# Patient Record
Sex: FEMALE | Race: WHITE | Employment: OTHER | URBAN - METROPOLITAN AREA
[De-identification: names, ages, dates, MRNs, and addresses within clinical notes are randomized per-mention and may not be internally consistent; named-entity substitution may affect disease eponyms.]

---

## 2018-07-13 ENCOUNTER — THERAPY VISIT (OUTPATIENT)
Dept: PHYSICAL THERAPY | Facility: CLINIC | Age: 68
End: 2018-07-13
Payer: MEDICARE

## 2018-07-13 DIAGNOSIS — M54.16 LUMBAR RADICULOPATHY: Primary | ICD-10-CM

## 2018-07-13 PROCEDURE — 97110 THERAPEUTIC EXERCISES: CPT | Mod: GP | Performed by: PHYSICAL THERAPIST

## 2018-07-13 PROCEDURE — G8979 MOBILITY GOAL STATUS: HCPCS | Mod: GP | Performed by: PHYSICAL THERAPIST

## 2018-07-13 PROCEDURE — G8978 MOBILITY CURRENT STATUS: HCPCS | Mod: GP | Performed by: PHYSICAL THERAPIST

## 2018-07-13 PROCEDURE — 97161 PT EVAL LOW COMPLEX 20 MIN: CPT | Mod: GP | Performed by: PHYSICAL THERAPIST

## 2018-07-13 PROCEDURE — 97530 THERAPEUTIC ACTIVITIES: CPT | Mod: GP | Performed by: PHYSICAL THERAPIST

## 2018-07-13 ASSESSMENT — ACTIVITIES OF DAILY LIVING (ADL)
STANDING_FOR_15_MINUTES: EXTREME DIFFICULTY
HOS_ADL_COUNT: 16
PUTTING_ON_SOCKS_AND_SHOES: UNABLE TO DO
TWISTING/PIVOTING_ON_INVOLVED_LEG: UNABLE TO DO
ROLLING_OVER_IN_BED: EXTREME DIFFICULTY
HOS_ADL_ITEM_SCORE_TOTAL: 14
SITTING_FOR_15_MINUTES: MODERATE DIFFICULTY
LIGHT_TO_MODERATE_WORK: MODERATE DIFFICULTY
WALKING_UP_STEEP_HILLS: UNABLE TO DO
WALKING_INITIALLY: MODERATE DIFFICULTY
HOS_ADL_SCORE(%): 21.88
DEEP_SQUATTING: UNABLE TO DO
RECREATIONAL_ACTIVITIES: UNABLE TO DO
GETTING_INTO_AND_OUT_OF_AN_AVERAGE_CAR: MODERATE DIFFICULTY
WALKING_APPROXIMATELY_10_MINUTES: MODERATE DIFFICULTY
STEPPING_UP_AND_DOWN_CURBS: MODERATE DIFFICULTY
WALKING_DOWN_STEEP_HILLS: UNABLE TO DO
GOING_UP_1_FLIGHT_OF_STAIRS: UNABLE TO DO
HEAVY_WORK: EXTREME DIFFICULTY
GOING_DOWN_1_FLIGHT_OF_STAIRS: UNABLE TO DO
HOS_ADL_HIGHEST_POTENTIAL_SCORE: 64
WALKING_15_MINUTES_OR_GREATER: EXTREME DIFFICULTY

## 2018-07-13 NOTE — LETTER
DEPARTMENT OF HEALTH AND HUMAN SERVICES  CENTERS FOR MEDICARE & MEDICAID SERVICES    PLAN/UPDATED PLAN OF PROGRESS FOR OUTPATIENT REHABILITATION    PATIENTS NAME:  Tila Stephen     : 1950    PROVIDER NUMBER:    [id number]    [id number]     PROVIDER NAME: [facility]ER[facility]    MEDICAL RECORD NUMBER: [id number]     START OF CARE DATE:  SOC Date: 18   TYPE:  PT    PRIMARY/TREATMENT DIAGNOSIS: (Pertinent Medical Diagnosis)  Lumbar radiculopathy    VISITS FROM START OF CARE:  Rxs Used: 1     [facility] Initial Evaluation -- Lumbar  Date: 2018  Tila Stephen is a 67 year old female with a lumbar condition.   Referral: Dr. Weiss  Work mechanical stresses:  NA  Employment status:  retired  Leisure mechanical stresses: photography  Functional disability score (DAMION/STarT Back):  DAMION 62.22%, Megha High (7), HOS 21.88  VAS score (0-10): 6-7/10 R low back, 7/10 numbness R LE and bottom of her feet    Patient goals/expectations  Alleviate pain so that patient may be able to walk, stand, sit without back or LE symptoms.     HISTORY:  Present symptoms: R lower back pain with symptoms down the R thigh.  Has numbness on the ball of her feet and toes.  Decreased sensation lateral R thigh  Pain quality (sharp/shooting/stabbing/aching/burning/cramping):  Aching, sharp, numbness/tingling   Paresthesia (yes/no):  yes    Present since (onset date): 2018.     Symptoms (improving/unchanging/worsening):  Worsening and went into the ER on 2018.     Symptoms commenced as a result of: slipped in the shower   Condition occurred in the following environment:   home     Symptoms at onset (back/thigh/leg): R anterior hip and side  Constant symptoms (back/thigh/leg): R lower back, R hip to the anterior thigh.   Intermittent symptoms (back/thigh/leg): none  Symptoms are made worse with the following: Always Bending, Always Sitting, Always Rising,  Always Standing, Always Walking, Time of day - Always as the day progresses, Always On the move and Other - Lifting and carrying   Symptoms are made better with the following: Always Lying and Other - prednisone    Disturbed sleep (yes/no):  yes   Sleeping postures (prone/sup/side R/L): supine with wedge under back as patient has sleep apnea    Previous history: Has had numbness in her feet for the past 2 years, balls of her feet, but has not noticed lower back pain.  Previous treatments: was in the hospital due to current episode for about 5 days, released 7/10/2018.    Specific Questions:  Cough/Sneeze/Strain (pos/neg): positive  Bowel/Bladder (normal/abnormal): negative  Gait (normal/abnormal): slowed due to pain, not tripping   Medications (nil/NSAIDS/analg/steroids/anticoag/other):  Muscle relaxants and Steroids  Medical allergies:  Bactrim  General health (excellent/good/fair/poor):  good  Pertinent medical history:  Osteoarthritis, Overweight and Sleep disorder/apnea  Imaging (None/Xray/MRI/Other):  MRI-disc bulge L5-S1, L2-3, 3 mm degenerative retrolisthesis at L 3-4, foraminal stenosis L 2-3, L 3-4, L 4-5.  Had a CT scan of abdomen and pelvis.  Heart was cleared.  Recent or major surgery (yes/no):  no  Night pain (yes/no): no  Accidents (yes/no): yes, as above, fell in shower  Unexplained weight loss (yes/no): no  Barriers at home: no  Other red flags: no    EXAMINATION    Posture:   Sitting (good/fair/poor): poor  Standing (good/fair/poor):fair  Lordosis (red/acc/normal): decreased  Correction of posture (better/worse/no effect): better    Lateral Shift (right/left/nil): nil  Relevant (yes/no):  NA  Other Observations: antalgic, slowed gait    Neurological:  Motor deficit:  Pain with resisted hip flexion B, felt on the Rt    Reflexes:  Symmetrical LE - brisk Patellar, diminished AJ  Sensory deficit:  Decreased sensation lateral R thigh and R lateral lower leg    Dural signs:  Seated SLR on the R - Produced  R low back pain when lacking about 30 deg of R knee extension.          Movement Loss:   Apollo Mod Min Nil Pain   Flexion  x x  Increased R LB, R LE during   Extension x    Increased R LBP   Side Gliding R  x   Increased R low back pain   Side Gliding L   x  Increased LBP and R lat hip     Test Movements:   During: produces, abolishes, increases, decreases, no effect, centralizing, peripheralizing   After: better, worse, no better, no worse, no effect, centralized, peripheralized    Pretest symptoms standing: R lower back down R LE laterally into f   Symptoms During Symptoms After ROM increased ROM decreased No Effect   FIS          Rep FIS          EIS Increases    No Worse         Rep EIS Increases    Worse      x   Pretest symptoms lying: R lower back ache with numbness down R LE, numbness bottom of feet    Symptoms During Symptoms After ROM  ROM  No Effect   LORRAINE          Rep LORRAINE          EIL Decreases    No Better         Rep EIL Decreases    Better    X  Dec tension with seated SLR     Static Tests:  Sitting slouched:  Increased symptoms when sitting    Sitting erect:  Support felt good  Standing slouched not tested    Standing erect:  Not tested  Lying prone in extension:  6-7/10 R low back pain with 7/10 R LE symptoms to foot with numbness of the bottom of both feet - decreased pain, centralizing symptoms with prone lying further centralization with decreased LE symptoms after.  Increased LROM, B.    Long sitting:  Not tested    Provisional Classification:  Derangement - Asymmetrical, unilateral, symptoms below knee    Principle of Management:  Education:  Patient to sit with lumbar roll.  Avoid slouched sitting and flexion biased activity. Discussed centralization/peripheralization.  Not ready to perform EIS as she had increased pain after the exercise.      Equipment provided:  Lumbar roll  Mechanical therapy (Y/N):  yes   Extension principle:  Prone lying (if needed) --> prone lying in extension (3' or more)  --> EIL 10 reps every 2 hours    Lateral Principle:  no  Flexion principle:  no      ASSESSMENT/PLAN:  Patient is a 67 year old female with lumbar complaints.    Patient has the following significant findings with corresponding treatment plan.                  Diagnosis 1:  Lumbar radiculopathy    Pain -  manual therapy, self management, education, directional preference exercise and home program  Decreased ROM/flexibility - manual therapy, therapeutic exercise and home program  Impaired muscle performance - neuro re-education and home program  Decreased function - therapeutic activities and home program  Impaired posture - neuro re-education, therapeutic activities and home program    Therapy Evaluation Codes:   1) History comprised of:   Personal factors that impact the plan of care:      None.    Comorbidity factors that impact the plan of care are:      Numbness/tingling, Osteoarthritis and Overweight.     Medications impacting care: None.  2) Examination of Body Systems comprised of:   Body structures and functions that impact the plan of care:      Lumbar spine.   Activity limitations that impact the plan of care are:      Bending, Cooking, Driving, Lifting, Sitting, Stairs, Standing and Walking.  3) Clinical presentation characteristics are:   Evolving/Changing.  4) Decision-Making    Moderate complexity using standardized patient assessment instrument and/or measureable assessment of functional outcome.  Cumulative Therapy Evaluation is: Moderate complexity.    Previous and current functional limitations:  (See Goal Flow Sheet for this information)    Short term and Long term goals: (See Goal Flow Sheet for this information)     Communication ability:  Patient appears to be able to clearly communicate and understand verbal and written communication and follow directions correctly.  Treatment Explanation - The following has been discussed with the patient:   RX ordered/plan of care  Anticipated  "outcomes  Possible risks and side effects    This patient would benefit from PT intervention to resume normal activities.   Rehab potential is good.  Frequency:  2 X week, once daily  Duration:  for 3 weeks tapering to 1 X a week over 6 weeks    Discharge Plan:  Achieve all LTG.  Independent in home treatment program.  Reach maximal therapeutic benefit.    Please refer to the daily flowsheet for treatment today, total treatment time and time spent performing 1:1 timed codes.     Caregiver Signature/Credentials _____________________________ Date ________       Treating Provider: Edenilson Deleon, PT     I have reviewed and certified the need for these services and plan of treatment while under my care.      PHYSICIAN'S SIGNATURE:   _____________________________________  Date___________              Yetmwork Dallin Weiss MD    Certification period:  Beginning of Cert date period: 07/13/18 to  End of Cert period date: 10/10/18     Functional Level Progress Report: Please see attached \"Goal Flow sheet for Functional level.\"    ____X____ Continue Services or       ________ DC Services                Service dates: From  SOC Date: 07/13/18 date to present                         "

## 2018-07-13 NOTE — PROGRESS NOTES
[facility] Initial Evaluation -- Lumbar    Date: July 13, 2018  Tila Stephen is a 67 year old female with a lumbar condition.   Referral: Dr. Weiss  Work mechanical stresses:  NA  Employment status:  retired  Leisure mechanical stresses: photography  Functional disability score (DAMION/STarT Back):  DAMION 62.22%, Megha High (7), HOS 21.88  VAS score (0-10): 6-7/10 R low back, 7/10 numbness R LE and bottom of her feet    Patient goals/expectations  Alleviate pain so that patient may be able to walk, stand, sit without back or LE symptoms.     HISTORY:    Present symptoms: R lower back pain with symptoms down the R thigh.  Has numbness on the ball of her feet and toes.  Decreased sensation lateral R thigh  Pain quality (sharp/shooting/stabbing/aching/burning/cramping):  Aching, sharp, numbness/tingling   Paresthesia (yes/no):  yes    Present since (onset date): June 24, 2018.     Symptoms (improving/unchanging/worsening):  Worsening and went into the ER on 7/6/2018.     Symptoms commenced as a result of: slipped in the shower   Condition occurred in the following environment:   home     Symptoms at onset (back/thigh/leg): R anterior hip and side  Constant symptoms (back/thigh/leg): R lower back, R hip to the anterior thigh.   Intermittent symptoms (back/thigh/leg): none    Symptoms are made worse with the following: Always Bending, Always Sitting, Always Rising, Always Standing, Always Walking, Time of day - Always as the day progresses, Always On the move and Other - Lifting and carrying   Symptoms are made better with the following: Always Lying and Other - prednisone    Disturbed sleep (yes/no):  yes Sleeping postures (prone/sup/side R/L): supine with wedge under back as patient has sleep apnea    Previous history: Has had numbness in her feet for the past 2 years, balls of her feet, but has not noticed lower back pain.  Previous treatments: was in the hospital due to current episode for  about 5 days, released 7/10/2018.      Specific Questions:  Cough/Sneeze/Strain (pos/neg): positive  Bowel/Bladder (normal/abnormal): negative  Gait (normal/abnormal): slowed due to pain, not tripping   Medications (nil/NSAIDS/analg/steroids/anticoag/other):  Muscle relaxants and Steroids  Medical allergies:  Bactrim  General health (excellent/good/fair/poor):  good  Pertinent medical history:  Osteoarthritis, Overweight and Sleep disorder/apnea  Imaging (None/Xray/MRI/Other):  MRI-disc bulge L5-S1, L2-3, 3 mm degenerative retrolisthesis at L 3-4, foraminal stenosis L 2-3, L 3-4, L 4-5.  Had a CT scan of abdomen and pelvis.  Heart was cleared.  Recent or major surgery (yes/no):  no  Night pain (yes/no): no  Accidents (yes/no): yes, as above, fell in shower  Unexplained weight loss (yes/no): no  Barriers at home: no  Other red flags: no    EXAMINATION    Posture:   Sitting (good/fair/poor): poor  Standing (good/fair/poor):fair  Lordosis (red/acc/normal): decreased  Correction of posture (better/worse/no effect): better    Lateral Shift (right/left/nil): nil  Relevant (yes/no):  NA  Other Observations: antalgic, slowed gait    Neurological:    Motor deficit:  Pain with resisted hip flexion B, felt on the Rt    Reflexes:  Symmetrical LE - brisk Patellar, diminished AJ  Sensory deficit:  Decreased sensation lateral R thigh and R lateral lower leg    Dural signs:  Seated SLR on the R - Produced R low back pain when lacking about 30 deg of R knee extension.      Movement Loss:   Apollo Mod Min Nil Pain   Flexion  x x  Increased R LB, R LE during   Extension x    Increased R LBP   Side Gliding R  x   Increased R low back pain   Side Gliding L   x  Increased LBP and R lat hip     Test Movements:   During: produces, abolishes, increases, decreases, no effect, centralizing, peripheralizing   After: better, worse, no better, no worse, no effect, centralized, peripheralized    Pretest symptoms standing: R lower back down R LE  laterally into f   Symptoms During Symptoms After ROM increased ROM decreased No Effect   FIS          Rep FIS          EIS Increases    No Worse         Rep EIS Increases    Worse      x   Pretest symptoms lying: R lower back ache with numbness down R LE, numbness bottom of feet    Symptoms During Symptoms After ROM  ROM  No Effect   LORRAINE          Rep LORRAINE          EIL Decreases    No Better         Rep EIL Decreases    Better    X  Dec tension with seated SLR         Static Tests:  Sitting slouched:  Increased symptoms when sitting    Sitting erect:  Support felt good  Standing slouched not tested    Standing erect:  Not tested  Lying prone in extension:  6-7/10 R low back pain with 7/10 R LE symptoms to foot with numbness of the bottom of both feet - decreased pain, centralizing symptoms with prone lying further centralization with decreased LE symptoms after.  Increased LROM, B.    Long sitting:  Not tested      Provisional Classification:  Derangement - Asymmetrical, unilateral, symptoms below knee    Principle of Management:  Education:  Patient to sit with lumbar roll.  Avoid slouched sitting and flexion biased activity. Discussed centralization/peripheralization.  Not ready to perform EIS as she had increased pain after the exercise.      Equipment provided:  Lumbar roll  Mechanical therapy (Y/N):  yes   Extension principle:  Prone lying (if needed) --> prone lying in extension (3' or more) --> EIL 10 reps every 2 hours    Lateral Principle:  no  Flexion principle:  no      ASSESSMENT/PLAN:    Patient is a 67 year old female with lumbar complaints.    Patient has the following significant findings with corresponding treatment plan.                Diagnosis 1:  Lumbar radiculopathy    Pain -  manual therapy, self management, education, directional preference exercise and home program  Decreased ROM/flexibility - manual therapy, therapeutic exercise and home program  Impaired muscle performance - neuro  re-education and home program  Decreased function - therapeutic activities and home program  Impaired posture - neuro re-education, therapeutic activities and home program    Therapy Evaluation Codes:   1) History comprised of:   Personal factors that impact the plan of care:      None.    Comorbidity factors that impact the plan of care are:      Numbness/tingling, Osteoarthritis and Overweight.     Medications impacting care: None.  2) Examination of Body Systems comprised of:   Body structures and functions that impact the plan of care:      Lumbar spine.   Activity limitations that impact the plan of care are:      Bending, Cooking, Driving, Lifting, Sitting, Stairs, Standing and Walking.  3) Clinical presentation characteristics are:   Evolving/Changing.  4) Decision-Making    Moderate complexity using standardized patient assessment instrument and/or measureable assessment of functional outcome.  Cumulative Therapy Evaluation is: Moderate complexity.    Previous and current functional limitations:  (See Goal Flow Sheet for this information)    Short term and Long term goals: (See Goal Flow Sheet for this information)     Communication ability:  Patient appears to be able to clearly communicate and understand verbal and written communication and follow directions correctly.  Treatment Explanation - The following has been discussed with the patient:   RX ordered/plan of care  Anticipated outcomes  Possible risks and side effects  This patient would benefit from PT intervention to resume normal activities.   Rehab potential is good.    Frequency:  2 X week, once daily  Duration:  for 3 weeks tapering to 1 X a week over 6 weeks  Discharge Plan:  Achieve all LTG.  Independent in home treatment program.  Reach maximal therapeutic benefit.    Please refer to the daily flowsheet for treatment today, total treatment time and time spent performing 1:1 timed codes.

## 2018-07-17 ENCOUNTER — THERAPY VISIT (OUTPATIENT)
Dept: PHYSICAL THERAPY | Facility: CLINIC | Age: 68
End: 2018-07-17
Payer: MEDICARE

## 2018-07-17 DIAGNOSIS — M54.16 LUMBAR RADICULOPATHY: ICD-10-CM

## 2018-07-17 PROCEDURE — 97110 THERAPEUTIC EXERCISES: CPT | Mod: GP | Performed by: PHYSICAL THERAPIST

## 2018-07-17 PROCEDURE — G0283 ELEC STIM OTHER THAN WOUND: HCPCS | Mod: GP | Performed by: PHYSICAL THERAPIST

## 2018-07-17 PROCEDURE — 97140 MANUAL THERAPY 1/> REGIONS: CPT | Mod: GP | Performed by: PHYSICAL THERAPIST

## 2018-07-20 ENCOUNTER — THERAPY VISIT (OUTPATIENT)
Dept: PHYSICAL THERAPY | Facility: CLINIC | Age: 68
End: 2018-07-20
Payer: MEDICARE

## 2018-07-20 DIAGNOSIS — M54.16 LUMBAR RADICULOPATHY: ICD-10-CM

## 2018-07-20 PROCEDURE — 97140 MANUAL THERAPY 1/> REGIONS: CPT | Mod: GP | Performed by: PHYSICAL THERAPIST

## 2018-07-20 PROCEDURE — 97110 THERAPEUTIC EXERCISES: CPT | Mod: GP | Performed by: PHYSICAL THERAPIST

## 2018-07-20 PROCEDURE — G0283 ELEC STIM OTHER THAN WOUND: HCPCS | Mod: GP | Performed by: PHYSICAL THERAPIST

## 2018-07-25 ENCOUNTER — THERAPY VISIT (OUTPATIENT)
Dept: PHYSICAL THERAPY | Facility: CLINIC | Age: 68
End: 2018-07-25
Payer: MEDICARE

## 2018-07-25 DIAGNOSIS — M54.16 LUMBAR RADICULOPATHY: ICD-10-CM

## 2018-07-25 PROCEDURE — 97140 MANUAL THERAPY 1/> REGIONS: CPT | Mod: GP | Performed by: PHYSICAL THERAPIST

## 2018-07-25 PROCEDURE — 97110 THERAPEUTIC EXERCISES: CPT | Mod: GP | Performed by: PHYSICAL THERAPIST

## 2018-07-27 ENCOUNTER — THERAPY VISIT (OUTPATIENT)
Dept: PHYSICAL THERAPY | Facility: CLINIC | Age: 68
End: 2018-07-27
Payer: MEDICARE

## 2018-07-27 DIAGNOSIS — M54.16 LUMBAR RADICULOPATHY: ICD-10-CM

## 2018-07-27 PROCEDURE — 97110 THERAPEUTIC EXERCISES: CPT | Mod: GP | Performed by: PHYSICAL THERAPIST

## 2018-07-27 PROCEDURE — 97140 MANUAL THERAPY 1/> REGIONS: CPT | Mod: GP | Performed by: PHYSICAL THERAPIST

## 2018-08-01 ENCOUNTER — THERAPY VISIT (OUTPATIENT)
Dept: PHYSICAL THERAPY | Facility: CLINIC | Age: 68
End: 2018-08-01
Payer: MEDICARE

## 2018-08-01 DIAGNOSIS — M54.16 LUMBAR RADICULOPATHY: ICD-10-CM

## 2018-08-01 PROCEDURE — 97140 MANUAL THERAPY 1/> REGIONS: CPT | Mod: GP | Performed by: PHYSICAL THERAPY ASSISTANT

## 2018-08-01 PROCEDURE — 97110 THERAPEUTIC EXERCISES: CPT | Mod: GP | Performed by: PHYSICAL THERAPY ASSISTANT

## 2018-08-03 ENCOUNTER — THERAPY VISIT (OUTPATIENT)
Dept: PHYSICAL THERAPY | Facility: CLINIC | Age: 68
End: 2018-08-03
Payer: MEDICARE

## 2018-08-03 DIAGNOSIS — M54.16 LUMBAR RADICULOPATHY: ICD-10-CM

## 2018-08-03 PROCEDURE — 97140 MANUAL THERAPY 1/> REGIONS: CPT | Mod: GP | Performed by: PHYSICAL THERAPIST

## 2018-08-03 PROCEDURE — 97110 THERAPEUTIC EXERCISES: CPT | Mod: GP | Performed by: PHYSICAL THERAPIST

## 2018-08-07 ENCOUNTER — THERAPY VISIT (OUTPATIENT)
Dept: PHYSICAL THERAPY | Facility: CLINIC | Age: 68
End: 2018-08-07
Payer: MEDICARE

## 2018-08-07 DIAGNOSIS — M54.16 LUMBAR RADICULOPATHY: ICD-10-CM

## 2018-08-07 PROCEDURE — 97140 MANUAL THERAPY 1/> REGIONS: CPT | Mod: GP | Performed by: PHYSICAL THERAPIST

## 2018-08-07 PROCEDURE — 97110 THERAPEUTIC EXERCISES: CPT | Mod: GP | Performed by: PHYSICAL THERAPIST

## 2018-08-09 ENCOUNTER — THERAPY VISIT (OUTPATIENT)
Dept: PHYSICAL THERAPY | Facility: CLINIC | Age: 68
End: 2018-08-09
Payer: MEDICARE

## 2018-08-09 DIAGNOSIS — M54.16 LUMBAR RADICULOPATHY: ICD-10-CM

## 2018-08-09 PROCEDURE — 97140 MANUAL THERAPY 1/> REGIONS: CPT | Mod: GP | Performed by: PHYSICAL THERAPIST

## 2018-08-09 PROCEDURE — 97110 THERAPEUTIC EXERCISES: CPT | Mod: GP | Performed by: PHYSICAL THERAPIST

## 2018-08-14 ENCOUNTER — THERAPY VISIT (OUTPATIENT)
Dept: PHYSICAL THERAPY | Facility: CLINIC | Age: 68
End: 2018-08-14
Payer: MEDICARE

## 2018-08-14 DIAGNOSIS — M54.16 LUMBAR RADICULOPATHY: ICD-10-CM

## 2018-08-14 PROCEDURE — G8979 MOBILITY GOAL STATUS: HCPCS | Mod: GP | Performed by: PHYSICAL THERAPIST

## 2018-08-14 PROCEDURE — G8978 MOBILITY CURRENT STATUS: HCPCS | Mod: GP | Performed by: PHYSICAL THERAPIST

## 2018-08-14 PROCEDURE — 97140 MANUAL THERAPY 1/> REGIONS: CPT | Mod: GP | Performed by: PHYSICAL THERAPIST

## 2018-08-14 PROCEDURE — 97110 THERAPEUTIC EXERCISES: CPT | Mod: GP | Performed by: PHYSICAL THERAPIST

## 2018-08-14 NOTE — MR AVS SNAPSHOT
After Visit Summary   8/14/2018    Tila Stephen    MRN: [id number]           Patient Information     Date Of Birth          1950        Visit Information        Provider Department      8/14/2018 10:20 AM Danielle Mcdermott, PT [facility]        Today's Diagnoses     Lumbar radiculopathy           Follow-ups after your visit        Your next 10 appointments already scheduled     Aug 30, 2018 10:20 AM CDT   [facility] Spine with Danielle Mcdermott, RBUIN   [facility] ([facility]  )    [address]  [facility] [address] [phone]              Who to contact     If you have questions or need follow up information about today's clinic visit or your schedule please contact [facility] PHYSICAL THERAPY directly at [phone].  Normal or non-critical lab and imaging results will be communicated to you by MyChart, letter or phone within 4 business days after the clinic has received the results. If you do not hear from us within 7 days, please contact the clinic through MyChart or phone. If you have a critical or abnormal lab result, we will notify you by phone as soon as possible.  Submit refill requests through [employer] or call your pharmacy and they will forward the refill request to us. Please allow 3 business days for your refill to be completed.          Additional Information About Your Visit        Care EveryWhere ID     This is your Care EveryWhere ID. This could be used by other organizations to access your [facility] medical records  [id number]         Blood Pressure from Last 3 Encounters:   08/17/10 129/60    Weight from Last 3 Encounters:   08/17/10 94.8 kg (209 lb)              We Performed the Following     [facility] PROGRESS NOTES REPORT     MANUAL THER TECH,1+REGIONS,EA 15 MIN     THERAPEUTIC EXERCISES        Primary Care Provider Office Phone # Fax #    Juan Otto [phone]  [phone]       [facility] [address] [facility] [address]  [facility] [address]        Equal Access to Services     NEFTALY RANGEL : Hadii adrienne okeefe tabathao Soscottie, waaxda luqadaha, qaybta kaalmada adeaziza, logan vernellin hayaacolton mccloudjovanni david corrine castelan. So [facility] [phone].    ATENCIÓN: Si habla español, tiene a carter disposición servicios gratuitos de asistencia lingüística. Chan al [phone].    We comply with applicable federal civil rights laws and [place] laws. We do not discriminate on the basis of race, color, national origin, age, disability, sex, sexual orientation, or gender identity.            Thank you!     Thank you for choosing [facility] PHYSICAL THERAPY  for your care. Our goal is always to provide you with excellent care. Hearing back from our patients is one way we can continue to improve our services. Please take a few minutes to complete the written survey that you may receive in the mail after your visit with us. Thank you!             Your Updated Medication List - Protect others around you: Learn how to safely use, store and throw away your medicines at www.disposemymeds.org.          This list is accurate as of 8/14/18 11:26 AM.  Always use your most recent med list.                   Brand Name Dispense Instructions for use Diagnosis    * amitriptyline 25 MG tablet    ELAVIL     1 TABLET 3 TIMES DAILY        * amitriptyline 50 MG tablet    ELAVIL     Take 50 mg by mouth At Bedtime.        aspirin 81 MG tablet      1 TABLET DAILY IN PM        * atenolol 50 MG tablet    TENORMIN     1 TABLET DAILY        * atenolol 25 MG tablet    TENORMIN     Take 25 mg by mouth daily.        hydrocortisone 25 MG Suppository    ANUSOL-HC     25 mg 2 times daily. Insert 1 supp twice daily.        lisinopril-hydrochlorothiazide 10-12.5 MG per tablet    PRINZIDE/ZESTORETIC     1 TABLET DAILY IN AM        lovastatin 20 MG tablet    MEVACOR     1 TABLET DAILY AT DINNER        * Notice:   This list has 4 medication(s) that are the same as other medications prescribed for you. Read the directions carefully, and ask your doctor or other care provider to review them with you.

## 2018-08-14 NOTE — PROGRESS NOTES
Subjective:  HPI  Oswestry Score: 40 %                 Objective:  System    Physical Exam    General     ROS    Assessment/Plan:    PROGRESS  REPORT    Progress reporting period is from 7/13/18 to 8/14/18.       SUBJECTIVE  Subjective changes noted by patient:  Patient reports the back continues to get better but the right groin pain is still very painful. Will be following up with getting an injection lateral today.    Current pain level is 3/10.     Previous pain level was 10/10.   Changes in function:  Yes (See Goal flowsheet attached for changes in current functional level)  Adverse reaction to treatment or activity: None    OBJECTIVE  Changes noted in objective findings:  Yes,   Objective: Pre-test symptom: Mild right low back into the right groin   Neurological:     Motor deficit:  Pain with resisted hip flexion B, felt on the Rt                                            Sensory deficit:  Decreased sensation lateral R thigh                                        Dural signs:  Slump (+) Right   Movement Loss:    Apollo Mod Min Nil Pain   Flexion     x  None   Extension     x   Increased R LBP   Side Gliding R    x    Increased R low back pain   Side Gliding L     x   Increased R low back pain        ASSESSMENT/PLAN  Updated problem list and treatment plan: Diagnosis 1:  Low back with right leg pain  Pain -  hot/cold therapy, manual therapy, self management, education and directional preference exercise  Decreased ROM/flexibility - manual therapy and therapeutic exercise  Decreased strength - therapeutic exercise and therapeutic activities  Impaired muscle performance - neuro re-education  Decreased function - therapeutic activities  STG/LTGs have been met or progress has been made towards goals:  Yes (See Goal flow sheet completed today.)  Assessment of Progress: The patient's condition has potential to improve.  Self Management Plans:  Patient has been instructed in a home treatment program.  Patient  has been  instructed in self management of symptoms.  I have re-evaluated this patient and find that the nature, scope, duration and intensity of the therapy is appropriate for the medical condition of the patient.  Tila continues to require the following intervention to meet STG and LTG's:  PT    Recommendations:  This patient would benefit from continued therapy.     Frequency:  1 X week, once daily  Duration:  for 8 weeks        Please refer to the daily flowsheet for treatment today, total treatment time and time spent performing 1:1 timed codes.

## 2018-08-14 NOTE — LETTER
[facility] PHYSICAL THERAPY  [address]  [facility] [address]  [phone]    2018    Re: Tila Stephen   :   1950  MRN:  [id number]   REFERRING PHYSICIAN:   Azalea Weiss    [facility] PHYSICAL THERAPY  Date of Initial Evaluation:  18  Visits:  Rxs Used: 10  Reason for Referral:  Lumbar radiculopathy    Oswestry Score: 40 %                 PROGRESS  REPORT  Progress reporting period is from 18 to 18.       SUBJECTIVE  Subjective changes noted by patient:  Patient reports the back continues to get better but the right groin pain is still very painful. Will be following up with getting an injection lateral today.      Current pain level is 3/10.     Previous pain level was 10/10.   Changes in function:  Yes (See Goal flowsheet attached for changes in current functional level)  Adverse reaction to treatment or activity: None    OBJECTIVE  Changes noted in objective findings:  Yes,   Objective: Pre-test symptom: Mild right low back into the right groin     Neurological:   Motor deficit:  Pain with resisted hip flexion B, felt on the Rt                                            Sensory deficit:  Decreased sensation lateral R thigh                                        Dural signs:  Slump (+) Right     Movement Loss:    Apollo Mod Min Nil Pain   Flexion     x  None   Extension     x   Increased R LBP   Side Gliding R    x    Increased R low back pain   Side Gliding L     x   Increased R low back pain      ASSESSMENT/PLAN  Updated problem list and treatment plan:   Diagnosis 1:  Low back with right leg pain  Pain -  hot/cold therapy, manual therapy, self management, education and directional preference exercise    Decreased ROM/flexibility - manual therapy and therapeutic exercise  Decreased strength - therapeutic exercise and therapeutic activities  Impaired muscle performance - neuro re-education  Decreased function -  therapeutic activities    STG/LTGs have been met or progress has been made towards goals:  Yes (See Goal flow sheet completed today.)    Assessment of Progress: The patient's condition has potential to improve.    Self Management Plans:  Patient has been instructed in a home treatment program.  Patient  has been instructed in self management of symptoms.    I have re-evaluated this patient and find that the nature, scope, duration and intensity of the therapy is appropriate for the medical condition of the patient.    Tila continues to require the following intervention to meet STG and LTG's:  PT    Recommendations:  This patient would benefit from continued therapy.     Frequency:  1 X week, once daily  Duration:  for 8 weeks    Thank you for your referral.    INQUIRIES  Therapist: Danielle Mcdermott DPT  [facility] PHYSICAL THERAPY  [address]  [facility] [address]  Phone: [phone]  Fax: [phone]

## 2018-08-30 ENCOUNTER — THERAPY VISIT (OUTPATIENT)
Dept: PHYSICAL THERAPY | Facility: CLINIC | Age: 68
End: 2018-08-30
Payer: MEDICARE

## 2018-08-30 DIAGNOSIS — M54.16 LUMBAR RADICULOPATHY: ICD-10-CM

## 2018-08-30 PROCEDURE — G8979 MOBILITY GOAL STATUS: HCPCS | Mod: GP | Performed by: PHYSICAL THERAPIST

## 2018-08-30 PROCEDURE — 97110 THERAPEUTIC EXERCISES: CPT | Mod: GP | Performed by: PHYSICAL THERAPIST

## 2018-08-30 PROCEDURE — 97140 MANUAL THERAPY 1/> REGIONS: CPT | Mod: GP | Performed by: PHYSICAL THERAPIST

## 2018-08-30 PROCEDURE — G8978 MOBILITY CURRENT STATUS: HCPCS | Mod: GP | Performed by: PHYSICAL THERAPIST

## 2019-09-25 ENCOUNTER — THERAPY VISIT (OUTPATIENT)
Dept: PHYSICAL THERAPY | Facility: CLINIC | Age: 69
End: 2019-09-25
Payer: MEDICARE

## 2019-09-25 DIAGNOSIS — M25.511 BILATERAL SHOULDER PAIN: ICD-10-CM

## 2019-09-25 DIAGNOSIS — M25.512 BILATERAL SHOULDER PAIN: ICD-10-CM

## 2019-09-25 PROBLEM — M54.16 LUMBAR RADICULOPATHY: Status: RESOLVED | Noted: 2018-07-13 | Resolved: 2019-09-25

## 2019-09-25 PROCEDURE — 97161 PT EVAL LOW COMPLEX 20 MIN: CPT | Mod: GP | Performed by: PHYSICAL THERAPIST

## 2019-09-25 PROCEDURE — G0283 ELEC STIM OTHER THAN WOUND: HCPCS | Mod: GP | Performed by: PHYSICAL THERAPIST

## 2019-09-25 PROCEDURE — 97110 THERAPEUTIC EXERCISES: CPT | Mod: GP | Performed by: PHYSICAL THERAPIST

## 2019-09-25 NOTE — PROGRESS NOTES
[facility] Initial Evaluation  Subjective:  The history is provided by the patient. No  was used.   Type of problem:  Bilateral shoulders   Condition occurred with:  Unknown cause. This is a chronic condition   Problem details: Saw MD 9/20/19 due to ongoing bilateral shoulder pain.  Noticed this pain starting 5-6 months ago with waking up in the middle of the night due to pain.   Patient reports pain:  Upper arm and upper trap. Radiates to:  Cervical. Associated symptoms:  Loss of motion/stiffness. Symptoms are exacerbated by other (lying flat of back, rotating neck) and relieved by NSAID's.    Tila Stephen being seen for Neck; bilateral shoulder pain.   Where condition occurred: for unknown reasons.Problem occurred: unknown  and reported as 10/10 on pain scale. General health as reported by patient is fair. Pertinent medical history includes:  Cancer, depression, fibromyalgia, high blood pressure, menopausal, numbness/tingling, overweight and sleep disorder/apnea.   Other medical allergies details: Bactrim.  Surgeries include:  Cancer surgery. Other surgery history details: Breast.  Current medications:  High blood pressure medication, pain medication and anti-depressants.     Pain is described as aching, sharp and shooting and is constant. Pain is the same all the time. Since onset symptoms are unchanged.   There was none improvement following previous treatment.   Patient is None.   Barriers include:  None as reported by patient.  Red flags:  None as reported by patient.                      Objective:  System                   Shoulder Evaluation:  ROM:  AROM:    Flexion:  Left:  130    Right:  132  Extension: Left: 35Right: 35  Abduction:  Left: 110   Right:  105    Internal Rotation:  Left:  L5    Right:  L5  External Rotation:  Left:  65    Right:  72                      Strength:    Flexion: Left:4/5   Pain:    Right: 4/5     Pain:     Abduction:  Left: 4/5   Pain:    Right: 4/5     Pain:    Internal Rotation:  Left:4/5     Pain:    Right: 4/5     Pain:  External Rotation:   Left:4/5     Pain:   Right:4/5     Pain:              Special Tests:      Left shoulder negative for the following special tests:  Rotator cuff tear    Right shoulder negative for the following special tests:Rotator cuff tear                                       Asael Cervical Evaluation      Movement Loss:    Flexion (Flex): min and pain  Retraction (RET): mod and pain  Extension (EXT): min, mod and pain  Lateral Flexion Right (LF R): mod and pain  Lateral Flexion Left (LF L): mod and pain  Rotation Right (ROT R): mod and pain  Rotation Left (ROT L): mod and pain                                                 ROS    Assessment/Plan:    Patient is a 69 year old female with cervical and both sides shoulder complaints.    Patient has the following significant findings with corresponding treatment plan.                Diagnosis 1:  Neck; Bilateral shoulders  Pain -  hot/cold therapy, electric stimulation, manual therapy, self management, education and directional preference exercise  Decreased ROM/flexibility - manual therapy and therapeutic exercise  Decreased strength - therapeutic exercise and therapeutic activities  Impaired muscle performance - neuro re-education  Decreased function - therapeutic activities  Impaired posture - neuro re-education    Therapy Evaluation Codes:   1) History comprised of:   Personal factors that impact the plan of care:      Time since onset of symptoms.    Comorbidity factors that impact the plan of care are:      Overweight.     Medications impacting care: Anti-inflammatory.  2) Examination of Body Systems comprised of:   Body structures and functions that impact the plan of care:      Cervical spine and Shoulder.   Activity limitations that impact the plan of care are:      Sleeping.  3) Clinical presentation characteristics  are:   Stable/Uncomplicated.  4) Decision-Making    Low complexity using standardized patient assessment instrument and/or measureable assessment of functional outcome.  Cumulative Therapy Evaluation is: Low complexity.    Previous and current functional limitations:  (See Goal Flow Sheet for this information)    Short term and Long term goals: (See Goal Flow Sheet for this information)     Communication ability:  Patient appears to be able to clearly communicate and understand verbal and written communication and follow directions correctly.  Treatment Explanation - The following has been discussed with the patient:   RX ordered/plan of care  Anticipated outcomes  Possible risks and side effects  This patient would benefit from PT intervention to resume normal activities.   Rehab potential is good.    Frequency:  1 X week, once daily  Duration:  for 8 weeks  Discharge Plan:  Achieve all LTG.  Independent in home treatment program.  Reach maximal therapeutic benefit.    Please refer to the daily flowsheet for treatment today, total treatment time and time spent performing 1:1 timed codes.

## 2019-09-25 NOTE — LETTER
DEPARTMENT OF HEALTH AND HUMAN SERVICES  CENTERS FOR MEDICARE & MEDICAID SERVICES    PLAN/UPDATED PLAN OF PROGRESS FOR OUTPATIENT REHABILITATION    PATIENTS NAME:  Tila Stephen     : 1950    PROVIDER NUMBER:    [id number]    HICN: [id number]    PROVIDER NAME: [facility] PHYSICAL THERAPY    MEDICAL RECORD NUMBER: [id number]     START OF CARE DATE:  SOC Date: 19   TYPE:  PT    PRIMARY/TREATMENT DIAGNOSIS: (Pertinent Medical Diagnosis)  Bilateral shoulder pain    VISITS FROM START OF CARE:  Rxs Used: 1     [facility] Initial Evaluation    Subjective:  The history is provided by the patient. No  was used.   Type of problem:  Bilateral shoulders    Condition occurred with:  Unknown cause. This is a chronic condition   Problem details: Saw MD 19 due to ongoing bilateral shoulder pain.  Noticed this pain starting 5-6 months ago with waking up in the middle of the night due to pain.   Patient reports pain:  Upper arm and upper trap. Radiates to:  Cervical. Associated symptoms:  Loss of motion/stiffness. Symptoms are exacerbated by other (lying flat of back, rotating neck) and relieved by NSAID's.    Tila Stephen being seen for Neck; bilateral shoulder pain.   Where condition occurred: for unknown reasons.Problem occurred: unknown  and reported as 10/10 on pain scale. General health as reported by patient is fair.     Pertinent medical history includes:  Cancer, depression, fibromyalgia, high blood pressure, menopausal, numbness/tingling, overweight and sleep disorder/apnea.   Other medical allergies details: Bactrim.  Surgeries include:  Cancer surgery. Other surgery history details: Breast.  Current medications:  High blood pressure medication, pain medication and anti-depressants.         Pain is described as aching, sharp and shooting and is constant. Pain is the same all the time. Since onset symptoms are unchanged.       There  was none improvement following previous treatment.   Patient is None.     Barriers include:  None as reported by patient.  Red flags:  None as reported by patient.                Objective:        Shoulder Evaluation:  ROM:  AROM:    Flexion:  Left:  130    Right:  132  Extension: Left: 35Right: 35  Abduction:  Left: 110   Right:  105    Internal Rotation:  Left:  L5    Right:  L5  External Rotation:  Left:  65    Right:  72    Strength:    Flexion: Left:4/5   Pain:    Right: 4/5     Pain:   Abduction:  Left: 4/5  Pain:    Right: 4/5     Pain:  Internal Rotation:  Left:4/5     Pain:    Right: 4/5     Pain:  External Rotation:   Left:4/5     Pain:   Right:4/5     Pain:      Special Tests:    Left shoulder negative for the following special tests:  Rotator cuff tear  Right shoulder negative for the following special tests:Rotator cuff tear    Asael Cervical Evaluation    Movement Loss:  Flexion (Flex): min and pain  Retraction (RET): mod and pain  Extension (EXT): min, mod and pain  Lateral Flexion Right (LF R): mod and pain  Lateral Flexion Left (LF L): mod and pain  Rotation Right (ROT R): mod and pain  Rotation Left (ROT L): mod and pain    Assessment/Plan:    Patient is a 69 year old female with cervical and both sides shoulder complaints.    Patient has the following significant findings with corresponding treatment plan.                  Diagnosis 1:  Neck; Bilateral shoulders  Pain -  hot/cold therapy, electric stimulation, manual therapy, self management, education and directional preference exercise  Decreased ROM/flexibility - manual therapy and therapeutic exercise  Decreased strength - therapeutic exercise and therapeutic activities  Impaired muscle performance - neuro re-education  Decreased function - therapeutic activities  Impaired posture - neuro re-education    Therapy Evaluation Codes:   1) History comprised of:   Personal factors that impact the plan of care:      Time since onset of symptoms.     Comorbidity factors that impact the plan of care are:      Overweight.     Medications impacting care: Anti-inflammatory.  2) Examination of Body Systems comprised of:   Body structures and functions that impact the plan of care:      Cervical spine and Shoulder.   Activity limitations that impact the plan of care are:      Sleeping.  3) Clinical presentation characteristics are:   Stable/Uncomplicated.  4) Decision-Making    Low complexity using standardized patient assessment instrument and/or measureable assessment of functional outcome.  Cumulative Therapy Evaluation is: Low complexity.    Previous and current functional limitations:  (See Goal Flow Sheet for this information)    Short term and Long term goals: (See Goal Flow Sheet for this information)     Communication ability:  Patient appears to be able to clearly communicate and understand verbal and written communication and follow directions correctly.    Treatment Explanation - The following has been discussed with the patient:   RX ordered/plan of care  Anticipated outcomes  Possible risks and side effects    This patient would benefit from PT intervention to resume normal activities.   Rehab potential is good.  Frequency:  1 X week, once daily  Duration:  for 8 weeks  Discharge Plan:  Achieve all LTG.  Independent in home treatment program.  Reach maximal therapeutic benefit.    Please refer to the daily flowsheet for treatment today, total treatment time and time spent performing 1:1 timed codes.       Caregiver Signature/Credentials _____________________________ Date ________       Treating Provider: Danielle Mcdermott DPT     I have reviewed and certified the need for these services and plan of treatment while under my care.      PHYSICIAN'S SIGNATURE:   _____________________________________  Date___________              Carlos Beckford MD    Certification period:  Beginning of Cert date period: 09/25/19 to  End of Cert period date: 12/23/19  "    Functional Level Progress Report: Please see attached \"Goal Flow sheet for Functional level.\"    ____X____ Continue Services or       ________ DC Services                Service dates: From  SOC Date: 09/25/19 date to present                    "

## 2019-09-30 ENCOUNTER — THERAPY VISIT (OUTPATIENT)
Dept: PHYSICAL THERAPY | Facility: CLINIC | Age: 69
End: 2019-09-30
Payer: MEDICARE

## 2019-09-30 DIAGNOSIS — M25.511 BILATERAL SHOULDER PAIN: ICD-10-CM

## 2019-09-30 DIAGNOSIS — M25.512 BILATERAL SHOULDER PAIN: ICD-10-CM

## 2019-09-30 PROCEDURE — 97110 THERAPEUTIC EXERCISES: CPT | Mod: GP | Performed by: PHYSICAL THERAPIST

## 2019-10-07 ENCOUNTER — THERAPY VISIT (OUTPATIENT)
Dept: PHYSICAL THERAPY | Facility: CLINIC | Age: 69
End: 2019-10-07
Payer: MEDICARE

## 2019-10-07 DIAGNOSIS — M25.511 BILATERAL SHOULDER PAIN: ICD-10-CM

## 2019-10-07 DIAGNOSIS — M25.512 BILATERAL SHOULDER PAIN: ICD-10-CM

## 2019-10-07 PROCEDURE — 97110 THERAPEUTIC EXERCISES: CPT | Mod: GP | Performed by: PHYSICAL THERAPIST

## 2019-10-14 ENCOUNTER — THERAPY VISIT (OUTPATIENT)
Dept: PHYSICAL THERAPY | Facility: CLINIC | Age: 69
End: 2019-10-14
Payer: MEDICARE

## 2019-10-14 DIAGNOSIS — M25.511 BILATERAL SHOULDER PAIN: ICD-10-CM

## 2019-10-14 DIAGNOSIS — M25.512 BILATERAL SHOULDER PAIN: ICD-10-CM

## 2019-10-14 PROCEDURE — 97110 THERAPEUTIC EXERCISES: CPT | Mod: GP | Performed by: PHYSICAL THERAPIST

## 2019-10-14 NOTE — LETTER
[facility] PHYSICAL THERAPY  [address]  [facility] [address]  [phone]    2019    Re: Tila Stephen   :   1950  MRN:  [id number]   REFERRING PHYSICIAN:   Carlos Beckford    [facility] PHYSICAL THERAPY  Date of Initial Evaluation: 19  Visits:  Rxs Used: 4  Reason for Referral:  Bilateral shoulder pain    PROGRESS  REPORT  Progress reporting period is from 19 to 10/14/19.       SUBJECTIVE  Subjective changes noted by patient:  Patient reports overall the neck with resting feels better.  Anytime the neck/shoulders are being used (left>right) there is increased pain.  Has also noticed increased hand numbness Right>left      Current pain level is 3/10.     Previous pain level was  10/10.   Changes in function:  Yes (See Goal flowsheet attached for changes in current functional level)  Adverse reaction to treatment or activity: None    OBJECTIVE  Changes noted in objective findings:  Yes,     AROM    Left  Right  Flexion  110  145   Abduction 80  75  Extension 24  41  IR   L5  L5  ER  65  72    MMT  Flexion  4-/5  4/5  Abduction 4/5  4/5  IR  4/5  4/5  ER  4/5  4/5  Wrist ext 4-/5  4/5    CROM  Flexion nil loss  Retraction minimal loss PDM  Extension minimal-moderate loss    Rotation (L) minimal-moderate loss PDM  Rotation (R) minimal-moderate loss PDM      ASSESSMENT/PLAN  Updated problem list and treatment plan:     Diagnosis 1:  Bilateral shoulder pain  Pain -  manual therapy, self management, education and directional preference exercise  Decreased ROM/flexibility - manual therapy and therapeutic exercise  Decreased strength - therapeutic exercise and therapeutic activities  Impaired muscle performance - neuro re-education  Decreased function - therapeutic activities    STG/LTGs have been met or progress has been made towards goals:  Yes (See Goal flow sheet completed today.)    Assessment of Progress: The patient's progress  has plateaued.    Self Management Plans:  Patient has been instructed in a home treatment program.  Patient  has been instructed in self management of symptoms.    I have re-evaluated this patient and find that the nature, scope, duration and intensity of the therapy is appropriate for the medical condition of the patient.    Tila continues to require the following intervention to meet STG and LTG's:  PT    Recommendations:  This patient would benefit from continued therapy.     This patient would benefit from further evaluation to evlaute the neck.    Thank you for your referral.    INQUIRIES  Therapist: Danielle Mcdermott DPT  [facility] PHYSICAL THERAPY  [address]  [facility] [address]  Phone: [phone]  Fax: [phone]

## 2019-10-14 NOTE — PROGRESS NOTES
Subjective:  HPI                    Objective:  System    Physical Exam    General     ROS    Assessment/Plan:    PROGRESS  REPORT    Progress reporting period is from 9/25/19 to 10/14/19.       SUBJECTIVE  Subjective changes noted by patient:  Patient reports overall the neck with resting feels better.  Anytime the neck/shoulders are being used (left>right) there is increased pain.  Has also noticed increased hand numbness Right>left    Current pain level is 3/10.     Previous pain level was  10/10.   Changes in function:  Yes (See Goal flowsheet attached for changes in current functional level)  Adverse reaction to treatment or activity: None    OBJECTIVE  Changes noted in objective findings:  Yes,     AROM    Left  Right  Flexion  110  145   Abduction 80  75  Extension 24  41  IR   L5  L5  ER  65  72    MMT  Flexion  4-/5  4/5  Abduction 4/5  4/5  IR  4/5  4/5  ER  4/5  4/5  Wrist ext 4-/5  4/5    CROM  Flexion nil loss  Retraction minimal loss PDM  Extension minimal-moderate loss  Rotation (L) minimal-moderate loss PDM  Rotation (R) minimal-moderate loss PDM      ASSESSMENT/PLAN  Updated problem list and treatment plan: Diagnosis 1:  Bilateral shoulder pain  Pain -  manual therapy, self management, education and directional preference exercise  Decreased ROM/flexibility - manual therapy and therapeutic exercise  Decreased strength - therapeutic exercise and therapeutic activities  Impaired muscle performance - neuro re-education  Decreased function - therapeutic activities  STG/LTGs have been met or progress has been made towards goals:  Yes (See Goal flow sheet completed today.)  Assessment of Progress: The patient's progress has plateaued.  Self Management Plans:  Patient has been instructed in a home treatment program.  Patient  has been instructed in self management of symptoms.  I have re-evaluated this patient and find that the nature, scope, duration and intensity of the therapy is appropriate for the  medical condition of the patient.  Tila continues to require the following intervention to meet STG and LTG's:  PT    Recommendations:  This patient would benefit from continued therapy.     This patient would benefit from further evaluation to evlaute the neck.    Please refer to the daily flowsheet for treatment today, total treatment time and time spent performing 1:1 timed codes.

## 2019-12-14 PROBLEM — M25.512 BILATERAL SHOULDER PAIN: Status: RESOLVED | Noted: 2019-09-25 | Resolved: 2019-12-14

## 2019-12-14 PROBLEM — M25.511 BILATERAL SHOULDER PAIN: Status: RESOLVED | Noted: 2019-09-25 | Resolved: 2019-12-14

## 2019-12-14 NOTE — PROGRESS NOTES
Discharge Note    Progress reporting period is from initial evaluation date (please see noted date below) to Sep 30, 2019.  Linked Episodes   Type: Episode: Status: Noted: Resolved: Last update: Updated by:   PHYSICAL THERAPY B Shoulders-9/25/19 Active 9/25/2019  10/14/2019  9:06 AM Danielle Mcdermott PT      Comments:       Tila failed to follow up and current status is unknown.  Please see information below for last relevant information on current status.  Patient seen for 2 visits.    SUBJECTIVE  Subjective changes noted by patient:  Patient reports she contacted MD and an MRI was ordered of the neck.  Has been doing the exercises about 5x/day.  Has noticed more pain R>L and there is pain that causes power loss into the right arm.  .  Current pain level is  .     Previous pain level was  10/10.   Changes in function:  Yes (See Goal flowsheet attached for changes in current functional level)  Adverse reaction to treatment or activity: None    OBJECTIVE  Changes noted in objective findings: See CROM below Shoulder AROM  abduction (L) 70 (R) 55  Slight increased to 90 degrees bilateral after treatment     ASSESSMENT/PLAN  Diagnosis: Neck; B Shoulders   Updated problem list and treatment plan:     STG/LTGs have been met or progress has been made towards goals:  Yes, please see goal flowsheet for most current information  Assessment of Progress: current status is unknown.    Last current status:     Self Management Plans:  HEP  I have re-evaluated this patient and find that the nature, scope, duration and intensity of the therapy is appropriate for the medical condition of the patient.  Tila continues to require the following intervention to meet STG and LTG's:  HEP.    Recommendations:  Discharge with current home program.  Patient to follow up with MD as needed.    Please refer to the daily flowsheet for treatment today, total treatment time and time spent performing 1:1 timed codes.

## 2021-11-09 ENCOUNTER — LAB REQUISITION (OUTPATIENT)
Dept: LAB | Facility: CLINIC | Age: 71
End: 2021-11-09
Payer: MEDICARE

## 2021-11-09 DIAGNOSIS — R21 RASH AND OTHER NONSPECIFIC SKIN ERUPTION: ICD-10-CM

## 2021-11-09 LAB
ALBUMIN SERPL-MCNC: 3.5 G/DL (ref 3.4–5)
ALP SERPL-CCNC: 107 U/L (ref 40–150)
ALT SERPL W P-5'-P-CCNC: 26 U/L (ref 0–50)
ANION GAP SERPL CALCULATED.3IONS-SCNC: 4 MMOL/L (ref 3–14)
AST SERPL W P-5'-P-CCNC: 21 U/L (ref 0–45)
BILIRUB SERPL-MCNC: 0.4 MG/DL (ref 0.2–1.3)
BUN SERPL-MCNC: 14 MG/DL (ref 7–30)
CALCIUM SERPL-MCNC: 9 MG/DL (ref 8.5–10.1)
CHLORIDE BLD-SCNC: 109 MMOL/L (ref 94–109)
CO2 SERPL-SCNC: 26 MMOL/L (ref 20–32)
CREAT SERPL-MCNC: 1.08 MG/DL (ref 0.52–1.04)
ERYTHROCYTE [DISTWIDTH] IN BLOOD BY AUTOMATED COUNT: 14 % (ref 10–15)
GFR SERPL CREATININE-BSD FRML MDRD: 53 ML/MIN/1.73M2
GLUCOSE BLD-MCNC: 101 MG/DL (ref 70–99)
HCT VFR BLD AUTO: 39.6 % (ref 35–47)
HGB BLD-MCNC: 12.1 G/DL (ref 11.7–15.7)
MCH RBC QN AUTO: 28.8 PG (ref 26.5–33)
MCHC RBC AUTO-ENTMCNC: 30.6 G/DL (ref 31.5–36.5)
MCV RBC AUTO: 94 FL (ref 78–100)
PLATELET # BLD AUTO: 266 10E3/UL (ref 150–450)
POTASSIUM BLD-SCNC: 4 MMOL/L (ref 3.4–5.3)
PROT SERPL-MCNC: 7.2 G/DL (ref 6.8–8.8)
RBC # BLD AUTO: 4.2 10E6/UL (ref 3.8–5.2)
SODIUM SERPL-SCNC: 139 MMOL/L (ref 133–144)
WBC # BLD AUTO: 8.1 10E3/UL (ref 4–11)

## 2021-11-09 PROCEDURE — 85027 COMPLETE CBC AUTOMATED: CPT | Mod: ORL | Performed by: DERMATOLOGY

## 2021-11-09 PROCEDURE — 84630 ASSAY OF ZINC: CPT | Mod: ORL | Performed by: DERMATOLOGY

## 2021-11-09 PROCEDURE — 82747 ASSAY OF FOLIC ACID RBC: CPT | Mod: ORL | Performed by: DERMATOLOGY

## 2021-11-09 PROCEDURE — 80053 COMPREHEN METABOLIC PANEL: CPT | Mod: ORL | Performed by: DERMATOLOGY

## 2021-11-09 PROCEDURE — 36415 COLL VENOUS BLD VENIPUNCTURE: CPT | Mod: ORL | Performed by: DERMATOLOGY

## 2021-11-09 PROCEDURE — 82607 VITAMIN B-12: CPT | Mod: ORL | Performed by: DERMATOLOGY

## 2021-11-10 LAB — VIT B12 SERPL-MCNC: 202 PG/ML (ref 193–986)

## 2021-11-11 LAB — FOLATE RBC-MCNC: 1032 NG/ML

## 2021-11-12 LAB — ZINC SERPL-MCNC: 66.4 UG/DL

## 2021-11-21 ENCOUNTER — HEALTH MAINTENANCE LETTER (OUTPATIENT)
Age: 71
End: 2021-11-21

## 2022-09-02 ENCOUNTER — LAB REQUISITION (OUTPATIENT)
Dept: LAB | Facility: CLINIC | Age: 72
End: 2022-09-02
Payer: MEDICARE

## 2022-09-02 DIAGNOSIS — R30.9 PAINFUL MICTURITION, UNSPECIFIED: ICD-10-CM

## 2022-09-02 PROCEDURE — 87186 SC STD MICRODIL/AGAR DIL: CPT | Performed by: OBSTETRICS & GYNECOLOGY

## 2022-09-08 LAB — BACTERIA UR CULT: ABNORMAL

## 2022-11-16 ENCOUNTER — LAB REQUISITION (OUTPATIENT)
Dept: LAB | Facility: CLINIC | Age: 72
End: 2022-11-16
Payer: MEDICARE

## 2022-11-16 DIAGNOSIS — R35.0 FREQUENCY OF MICTURITION: ICD-10-CM

## 2022-11-16 PROCEDURE — 87086 URINE CULTURE/COLONY COUNT: CPT | Mod: ORL | Performed by: OBSTETRICS & GYNECOLOGY

## 2022-11-18 LAB — BACTERIA UR CULT: NORMAL

## 2023-01-14 ENCOUNTER — HEALTH MAINTENANCE LETTER (OUTPATIENT)
Age: 73
End: 2023-01-14

## 2023-12-03 ENCOUNTER — HEALTH MAINTENANCE LETTER (OUTPATIENT)
Age: 73
End: 2023-12-03

## 2024-02-11 ENCOUNTER — HEALTH MAINTENANCE LETTER (OUTPATIENT)
Age: 74
End: 2024-02-11

## 2024-08-13 ENCOUNTER — LAB REQUISITION (OUTPATIENT)
Dept: LAB | Facility: CLINIC | Age: 74
End: 2024-08-13
Payer: MEDICARE

## 2024-08-13 DIAGNOSIS — R30.0 DYSURIA: ICD-10-CM

## 2024-08-13 PROCEDURE — 87086 URINE CULTURE/COLONY COUNT: CPT | Mod: ORL | Performed by: OBSTETRICS & GYNECOLOGY

## 2024-08-14 LAB — BACTERIA UR CULT: ABNORMAL
